# Patient Record
Sex: MALE | Race: WHITE | NOT HISPANIC OR LATINO
[De-identification: names, ages, dates, MRNs, and addresses within clinical notes are randomized per-mention and may not be internally consistent; named-entity substitution may affect disease eponyms.]

---

## 2017-01-02 ENCOUNTER — FORM ENCOUNTER (OUTPATIENT)
Age: 72
End: 2017-01-02

## 2017-01-03 ENCOUNTER — APPOINTMENT (OUTPATIENT)
Dept: CT IMAGING | Facility: IMAGING CENTER | Age: 72
End: 2017-01-03

## 2017-01-03 ENCOUNTER — APPOINTMENT (OUTPATIENT)
Dept: UROLOGY | Facility: CLINIC | Age: 72
End: 2017-01-03

## 2017-01-03 ENCOUNTER — OUTPATIENT (OUTPATIENT)
Dept: OUTPATIENT SERVICES | Facility: HOSPITAL | Age: 72
LOS: 1 days | End: 2017-01-03

## 2017-01-03 ENCOUNTER — OUTPATIENT (OUTPATIENT)
Dept: OUTPATIENT SERVICES | Facility: HOSPITAL | Age: 72
LOS: 1 days | End: 2017-01-03
Payer: MEDICARE

## 2017-01-03 VITALS
TEMPERATURE: 98.3 F | HEART RATE: 64 BPM | RESPIRATION RATE: 17 BRPM | SYSTOLIC BLOOD PRESSURE: 154 MMHG | DIASTOLIC BLOOD PRESSURE: 89 MMHG

## 2017-01-03 DIAGNOSIS — R31.29 OTHER MICROSCOPIC HEMATURIA: ICD-10-CM

## 2017-01-03 DIAGNOSIS — Z87.891 PERSONAL HISTORY OF NICOTINE DEPENDENCE: ICD-10-CM

## 2017-01-03 DIAGNOSIS — R35.0 FREQUENCY OF MICTURITION: ICD-10-CM

## 2017-01-03 PROCEDURE — 52000 CYSTOURETHROSCOPY: CPT

## 2017-01-03 PROCEDURE — 74178 CT ABD&PLV WO CNTR FLWD CNTR: CPT

## 2017-01-03 PROCEDURE — 82565 ASSAY OF CREATININE: CPT

## 2017-01-07 DIAGNOSIS — Z87.891 PERSONAL HISTORY OF NICOTINE DEPENDENCE: ICD-10-CM

## 2017-01-07 DIAGNOSIS — R31.29 OTHER MICROSCOPIC HEMATURIA: ICD-10-CM

## 2017-12-22 ENCOUNTER — APPOINTMENT (OUTPATIENT)
Dept: CARDIOLOGY | Facility: CLINIC | Age: 72
End: 2017-12-22
Payer: MEDICARE

## 2017-12-22 ENCOUNTER — NON-APPOINTMENT (OUTPATIENT)
Age: 72
End: 2017-12-22

## 2017-12-22 VITALS
HEIGHT: 68 IN | HEART RATE: 70 BPM | RESPIRATION RATE: 16 BRPM | DIASTOLIC BLOOD PRESSURE: 94 MMHG | BODY MASS INDEX: 28.19 KG/M2 | WEIGHT: 186 LBS | SYSTOLIC BLOOD PRESSURE: 178 MMHG

## 2017-12-22 PROCEDURE — 93000 ELECTROCARDIOGRAM COMPLETE: CPT

## 2017-12-22 PROCEDURE — 99215 OFFICE O/P EST HI 40 MIN: CPT

## 2018-01-23 ENCOUNTER — APPOINTMENT (OUTPATIENT)
Dept: CARDIOLOGY | Facility: CLINIC | Age: 73
End: 2018-01-23
Payer: MEDICARE

## 2018-01-23 ENCOUNTER — MED ADMIN CHARGE (OUTPATIENT)
Age: 73
End: 2018-01-23

## 2018-01-23 PROCEDURE — 93015 CV STRESS TEST SUPVJ I&R: CPT

## 2018-01-23 PROCEDURE — A9500: CPT

## 2018-01-23 PROCEDURE — 78452 HT MUSCLE IMAGE SPECT MULT: CPT

## 2018-01-23 RX ORDER — REGADENOSON 0.08 MG/ML
0.4 INJECTION, SOLUTION INTRAVENOUS
Qty: 1 | Refills: 0 | Status: COMPLETED | OUTPATIENT
Start: 2018-01-23

## 2018-01-23 RX ADMIN — REGADENOSON 0.4 MG/5ML: 0.08 INJECTION, SOLUTION INTRAVENOUS at 00:00

## 2018-03-19 ENCOUNTER — MEDICATION RENEWAL (OUTPATIENT)
Age: 73
End: 2018-03-19

## 2018-04-20 ENCOUNTER — APPOINTMENT (OUTPATIENT)
Dept: CARDIOLOGY | Facility: CLINIC | Age: 73
End: 2018-04-20

## 2018-07-24 ENCOUNTER — APPOINTMENT (OUTPATIENT)
Dept: UROLOGY | Facility: CLINIC | Age: 73
End: 2018-07-24
Payer: MEDICARE

## 2018-07-24 VITALS
HEART RATE: 56 BPM | RESPIRATION RATE: 15 BRPM | SYSTOLIC BLOOD PRESSURE: 149 MMHG | WEIGHT: 187 LBS | BODY MASS INDEX: 28.34 KG/M2 | TEMPERATURE: 97.9 F | HEIGHT: 68 IN | DIASTOLIC BLOOD PRESSURE: 85 MMHG

## 2018-07-24 DIAGNOSIS — N28.1 CYST OF KIDNEY, ACQUIRED: ICD-10-CM

## 2018-07-24 PROCEDURE — 99214 OFFICE O/P EST MOD 30 MIN: CPT

## 2018-07-27 LAB
APPEARANCE: CLEAR
BACTERIA: NEGATIVE
BILIRUBIN URINE: NEGATIVE
BLOOD URINE: NEGATIVE
COLOR: YELLOW
GLUCOSE QUALITATIVE U: NEGATIVE MG/DL
HYALINE CASTS: 1 /LPF
KETONES URINE: NEGATIVE
LEUKOCYTE ESTERASE URINE: NEGATIVE
MICROSCOPIC-UA: NORMAL
NITRITE URINE: NEGATIVE
PH URINE: 5.5
PROTEIN URINE: NEGATIVE MG/DL
RED BLOOD CELLS URINE: 4 /HPF
SPECIFIC GRAVITY URINE: 1.02
SQUAMOUS EPITHELIAL CELLS: 1 /HPF
UROBILINOGEN URINE: NEGATIVE MG/DL
WHITE BLOOD CELLS URINE: 1 /HPF

## 2018-08-27 ENCOUNTER — NON-APPOINTMENT (OUTPATIENT)
Age: 73
End: 2018-08-27

## 2018-08-27 ENCOUNTER — APPOINTMENT (OUTPATIENT)
Dept: CARDIOLOGY | Facility: CLINIC | Age: 73
End: 2018-08-27
Payer: MEDICARE

## 2018-08-27 VITALS
RESPIRATION RATE: 14 BRPM | HEART RATE: 62 BPM | SYSTOLIC BLOOD PRESSURE: 166 MMHG | WEIGHT: 180 LBS | BODY MASS INDEX: 27.37 KG/M2 | OXYGEN SATURATION: 97 % | DIASTOLIC BLOOD PRESSURE: 90 MMHG

## 2018-08-27 PROCEDURE — 93000 ELECTROCARDIOGRAM COMPLETE: CPT

## 2018-08-27 PROCEDURE — 99214 OFFICE O/P EST MOD 30 MIN: CPT

## 2018-08-27 RX ORDER — AMLODIPINE BESYLATE 10 MG/1
10 TABLET ORAL
Qty: 90 | Refills: 1 | Status: DISCONTINUED | COMMUNITY
Start: 2017-12-22 | End: 2018-08-27

## 2018-09-25 ENCOUNTER — APPOINTMENT (OUTPATIENT)
Dept: HEPATOLOGY | Facility: CLINIC | Age: 73
End: 2018-09-25
Payer: MEDICARE

## 2018-09-25 VITALS
TEMPERATURE: 97.8 F | DIASTOLIC BLOOD PRESSURE: 89 MMHG | BODY MASS INDEX: 27.28 KG/M2 | HEART RATE: 58 BPM | SYSTOLIC BLOOD PRESSURE: 154 MMHG | WEIGHT: 180 LBS | RESPIRATION RATE: 14 BRPM | HEIGHT: 68 IN

## 2018-09-25 DIAGNOSIS — Z78.9 OTHER SPECIFIED HEALTH STATUS: ICD-10-CM

## 2018-09-25 PROCEDURE — 99204 OFFICE O/P NEW MOD 45 MIN: CPT

## 2018-09-26 LAB
ALBUMIN SERPL ELPH-MCNC: 4.6 G/DL
ALP BLD-CCNC: 66 U/L
ALT SERPL-CCNC: 31 U/L
ANION GAP SERPL CALC-SCNC: 12 MMOL/L
AST SERPL-CCNC: 25 U/L
BASOPHILS # BLD AUTO: 0.06 K/UL
BASOPHILS NFR BLD AUTO: 0.9 %
BILIRUB SERPL-MCNC: 0.8 MG/DL
BUN SERPL-MCNC: 19 MG/DL
CALCIUM SERPL-MCNC: 9.7 MG/DL
CHLORIDE SERPL-SCNC: 103 MMOL/L
CHOLEST SERPL-MCNC: 111 MG/DL
CHOLEST/HDLC SERPL: 2.6 RATIO
CO2 SERPL-SCNC: 25 MMOL/L
CREAT SERPL-MCNC: 0.97 MG/DL
EOSINOPHIL # BLD AUTO: 0.19 K/UL
EOSINOPHIL NFR BLD AUTO: 3 %
ESTIMATED AVERAGE GLUCOSE: 114 MG/DL
FERRITIN SERPL-MCNC: 229 NG/ML
GLUCOSE SERPL-MCNC: 107 MG/DL
HBA1C MFR BLD HPLC: 5.6 %
HCT VFR BLD CALC: 46.6 %
HCV AB SER QL: NONREACTIVE
HCV S/CO RATIO: 0.09 S/CO
HDLC SERPL-MCNC: 43 MG/DL
HGB BLD-MCNC: 15.7 G/DL
IMM GRANULOCYTES NFR BLD AUTO: 0.2 %
INR PPP: 1.03 RATIO
IRON SATN MFR SERPL: 52 %
IRON SERPL-MCNC: 152 UG/DL
LDLC SERPL CALC-MCNC: 57 MG/DL
LYMPHOCYTES # BLD AUTO: 1.64 K/UL
LYMPHOCYTES NFR BLD AUTO: 25.6 %
MAN DIFF?: NORMAL
MCHC RBC-ENTMCNC: 31.7 PG
MCHC RBC-ENTMCNC: 33.7 GM/DL
MCV RBC AUTO: 94.1 FL
MONOCYTES # BLD AUTO: 0.64 K/UL
MONOCYTES NFR BLD AUTO: 10 %
NEUTROPHILS # BLD AUTO: 3.86 K/UL
NEUTROPHILS NFR BLD AUTO: 60.3 %
PLATELET # BLD AUTO: 169 K/UL
POTASSIUM SERPL-SCNC: 5 MMOL/L
PROT SERPL-MCNC: 7.3 G/DL
PT BLD: 11.6 SEC
RBC # BLD: 4.95 M/UL
RBC # FLD: 13.9 %
SODIUM SERPL-SCNC: 140 MMOL/L
TIBC SERPL-MCNC: 294 UG/DL
TRIGL SERPL-MCNC: 53 MG/DL
UIBC SERPL-MCNC: 142 UG/DL
WBC # FLD AUTO: 6.4 K/UL

## 2018-10-15 ENCOUNTER — APPOINTMENT (OUTPATIENT)
Dept: MRI IMAGING | Facility: CLINIC | Age: 73
End: 2018-10-15

## 2018-10-21 ENCOUNTER — FORM ENCOUNTER (OUTPATIENT)
Age: 73
End: 2018-10-21

## 2018-10-22 ENCOUNTER — APPOINTMENT (OUTPATIENT)
Dept: MRI IMAGING | Facility: CLINIC | Age: 73
End: 2018-10-22
Payer: MEDICARE

## 2018-10-22 ENCOUNTER — OUTPATIENT (OUTPATIENT)
Dept: OUTPATIENT SERVICES | Facility: HOSPITAL | Age: 73
LOS: 1 days | End: 2018-10-22
Payer: MEDICARE

## 2018-10-22 DIAGNOSIS — K76.0 FATTY (CHANGE OF) LIVER, NOT ELSEWHERE CLASSIFIED: ICD-10-CM

## 2018-10-22 DIAGNOSIS — Z00.8 ENCOUNTER FOR OTHER GENERAL EXAMINATION: ICD-10-CM

## 2018-10-22 PROCEDURE — 74181 MRI ABDOMEN W/O CONTRAST: CPT

## 2018-10-22 PROCEDURE — 74181 MRI ABDOMEN W/O CONTRAST: CPT | Mod: 26

## 2018-10-31 ENCOUNTER — APPOINTMENT (OUTPATIENT)
Dept: HEPATOLOGY | Facility: CLINIC | Age: 73
End: 2018-10-31
Payer: MEDICARE

## 2018-10-31 PROCEDURE — 91200 LIVER ELASTOGRAPHY: CPT

## 2018-11-05 ENCOUNTER — APPOINTMENT (OUTPATIENT)
Dept: HEPATOLOGY | Facility: CLINIC | Age: 73
End: 2018-11-05

## 2018-12-11 ENCOUNTER — APPOINTMENT (OUTPATIENT)
Dept: HEPATOLOGY | Facility: CLINIC | Age: 73
End: 2018-12-11
Payer: MEDICARE

## 2018-12-11 VITALS
SYSTOLIC BLOOD PRESSURE: 169 MMHG | BODY MASS INDEX: 27.58 KG/M2 | RESPIRATION RATE: 14 BRPM | DIASTOLIC BLOOD PRESSURE: 100 MMHG | WEIGHT: 182 LBS | HEIGHT: 68 IN | TEMPERATURE: 98.2 F | HEART RATE: 62 BPM

## 2018-12-11 PROCEDURE — 99213 OFFICE O/P EST LOW 20 MIN: CPT

## 2019-02-11 ENCOUNTER — APPOINTMENT (OUTPATIENT)
Dept: CARDIOLOGY | Facility: CLINIC | Age: 74
End: 2019-02-11

## 2019-03-18 ENCOUNTER — APPOINTMENT (OUTPATIENT)
Dept: CARDIOLOGY | Facility: CLINIC | Age: 74
End: 2019-03-18
Payer: MEDICARE

## 2019-03-18 ENCOUNTER — NON-APPOINTMENT (OUTPATIENT)
Age: 74
End: 2019-03-18

## 2019-03-18 VITALS
WEIGHT: 183 LBS | OXYGEN SATURATION: 96 % | HEIGHT: 68 IN | BODY MASS INDEX: 27.74 KG/M2 | RESPIRATION RATE: 14 BRPM | HEART RATE: 64 BPM | DIASTOLIC BLOOD PRESSURE: 92 MMHG | SYSTOLIC BLOOD PRESSURE: 171 MMHG | TEMPERATURE: 97.9 F

## 2019-03-18 PROCEDURE — 93000 ELECTROCARDIOGRAM COMPLETE: CPT

## 2019-03-18 PROCEDURE — 99214 OFFICE O/P EST MOD 30 MIN: CPT

## 2019-03-18 RX ORDER — LOSARTAN POTASSIUM 100 MG/1
100 TABLET, FILM COATED ORAL
Qty: 90 | Refills: 1 | Status: ACTIVE | COMMUNITY
Start: 2019-03-18

## 2019-03-19 NOTE — REASON FOR VISIT
[FreeTextEntry1] : Wade Amaya returns today for f/u regarding reduced HTN, CAD, impaired LV EF and LBBB.

## 2019-03-19 NOTE — DISCUSSION/SUMMARY
[FreeTextEntry1] : Cardiomyopathy, non-ischemic.  Prior w/u demonstrated LV dysfunction out of proportion to epicardial CAD, perhaps on a hypertensive basis.  LV EF stable on recent echo; remains free of sxs.\par \par HTN - BP reasonable on current meds.    \par \par CAD, with non-obstructive disease on cardiac CTA in Oct. 2015.  Pharm-stress-thallium Jan 2018 showed stable findings.\par \par LBBB - unchanged.\par \par He will continue on aspirin and his other medications.\par \par He will return for a followup visit in 6 months.

## 2019-03-19 NOTE — PHYSICAL EXAM
[General Appearance - Well Developed] : well developed [Normal Appearance] : normal appearance [Well Groomed] : well groomed [General Appearance - Well Nourished] : well nourished [General Appearance - In No Acute Distress] : no acute distress [Normal Conjunctiva] : the conjunctiva exhibited no abnormalities [Eyelids - No Xanthelasma] : the eyelids demonstrated no xanthelasmas [Normal Jugular Venous A Waves Present] : normal jugular venous A waves present [Normal Jugular Venous V Waves Present] : normal jugular venous V waves present [Respiration, Rhythm And Depth] : normal respiratory rhythm and effort [Auscultation Breath Sounds / Voice Sounds] : lungs were clear to auscultation bilaterally [Heart Rate And Rhythm] : heart rate and rhythm were normal [Bowel Sounds] : normal bowel sounds [Abnormal Walk] : normal gait [Nail Clubbing] : no clubbing of the fingernails [Cyanosis, Localized] : no localized cyanosis [] : no rash [Skin Color & Pigmentation] : normal skin color and pigmentation [Oriented To Time, Place, And Person] : oriented to person, place, and time [Impaired Insight] : insight and judgment were intact [Affect] : the affect was normal [Mood] : the mood was normal [FreeTextEntry1] : 2+ pulses in the upper and lower extremities. No edema.

## 2019-03-19 NOTE — ASSESSMENT
[FreeTextEntry1] : Cardiomyopathy, non-ischemic.  Prior w/u demonstrated LV dysfunction out of proportion to epicardial CAD.\par \par ASCAD, with non-obstructive disease on cardiac CTA in Oct. 2015\par \par HTN\par \par LBBB\par \par Mild MR/TR

## 2019-03-19 NOTE — HISTORY OF PRESENT ILLNESS
[FreeTextEntry1] : I last saw him last in August.  He has a history of HTN.  Prior echo has shown impaired LV EF.  Cardiac CT angiogram in 2015 showed non-obstructive CAD; LV EF on that study was estimated to be 52%.  \par \par As he returns today, he has been well.  With his usual activities, he describes no cardiac symptoms.  There have been no episodes of exertional chest discomfort or dyspnea. He reports no palpitations or lightheadedness. There have been no episodes of orthopnea or PND.

## 2019-06-05 ENCOUNTER — FORM ENCOUNTER (OUTPATIENT)
Age: 74
End: 2019-06-05

## 2019-06-06 ENCOUNTER — APPOINTMENT (OUTPATIENT)
Dept: ULTRASOUND IMAGING | Facility: CLINIC | Age: 74
End: 2019-06-06
Payer: MEDICARE

## 2019-06-06 ENCOUNTER — OUTPATIENT (OUTPATIENT)
Dept: OUTPATIENT SERVICES | Facility: HOSPITAL | Age: 74
LOS: 1 days | End: 2019-06-06
Payer: MEDICARE

## 2019-06-06 DIAGNOSIS — K75.3 GRANULOMATOUS HEPATITIS, NOT ELSEWHERE CLASSIFIED: ICD-10-CM

## 2019-06-06 DIAGNOSIS — K76.0 FATTY (CHANGE OF) LIVER, NOT ELSEWHERE CLASSIFIED: ICD-10-CM

## 2019-06-06 PROCEDURE — 76700 US EXAM ABDOM COMPLETE: CPT

## 2019-06-06 PROCEDURE — 76700 US EXAM ABDOM COMPLETE: CPT | Mod: 26

## 2019-06-11 ENCOUNTER — APPOINTMENT (OUTPATIENT)
Dept: HEPATOLOGY | Facility: CLINIC | Age: 74
End: 2019-06-11
Payer: MEDICARE

## 2019-06-11 VITALS
HEIGHT: 68 IN | DIASTOLIC BLOOD PRESSURE: 84 MMHG | HEART RATE: 63 BPM | BODY MASS INDEX: 27.74 KG/M2 | SYSTOLIC BLOOD PRESSURE: 154 MMHG | WEIGHT: 183 LBS | TEMPERATURE: 97.7 F

## 2019-06-11 PROCEDURE — 99214 OFFICE O/P EST MOD 30 MIN: CPT
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
## 2019-06-11 NOTE — CONSULT LETTER
[Please see my note below.] : Please see my note below. [Dear  ___] : Dear  [unfilled], [Courtesy Letter:] : I had the pleasure of seeing your patient, [unfilled], in my office today. [Referral Closing:] : Thank you very much for seeing this patient.  If you have any questions, please do not hesitate to contact me. [Sincerely,] : Sincerely, [FreeTextEntry2] : PCP, ALLISON MARKS\par  [FreeTextEntry3] : Bulmaro Glasgow MD\par , [facility]\par General Hepatology and Gastroenterology\par [facility]\par [facility]\par [address] [place] [address]\[place] [address]\par [phone]\par

## 2019-09-08 LAB
APPEARANCE: CLEAR
BACTERIA UR CULT: NORMAL
BACTERIA: NEGATIVE
BILIRUBIN URINE: NEGATIVE
BLOOD URINE: NEGATIVE
COLOR: YELLOW
GLUCOSE QUALITATIVE U: NEGATIVE
HYALINE CASTS: 1 /LPF
KETONES URINE: NEGATIVE
LEUKOCYTE ESTERASE URINE: NEGATIVE
MICROSCOPIC-UA: NORMAL
NITRITE URINE: NEGATIVE
PH URINE: 6.5
PROTEIN URINE: NORMAL
RED BLOOD CELLS URINE: 6 /HPF
SPECIFIC GRAVITY URINE: 1.02
SQUAMOUS EPITHELIAL CELLS: 2 /HPF
UROBILINOGEN URINE: NORMAL
WHITE BLOOD CELLS URINE: 2 /HPF

## 2019-09-10 ENCOUNTER — APPOINTMENT (OUTPATIENT)
Dept: UROLOGY | Facility: CLINIC | Age: 74
End: 2019-09-10
Payer: MEDICARE

## 2019-09-10 DIAGNOSIS — R35.1 NOCTURIA: ICD-10-CM

## 2019-09-10 DIAGNOSIS — G47.30 SLEEP APNEA, UNSPECIFIED: ICD-10-CM

## 2019-09-10 PROCEDURE — 99213 OFFICE O/P EST LOW 20 MIN: CPT

## 2019-09-11 PROBLEM — R35.1 NOCTURIA: Status: ACTIVE | Noted: 2018-07-24

## 2019-09-11 PROBLEM — G47.30 SLEEP APNEA: Status: ACTIVE | Noted: 2019-09-11

## 2019-09-11 NOTE — LETTER BODY
[FreeTextEntry1] : Percy Merlos MD\par [address]\[place] [address]\par \par Dear Dr. Merlos,\par \par Wade Amaya returns to the office today. He is a 74-year-old man who I last saw in July 2018. I have seen him in the past regarding microscopic hematuria and in 2017 performed evaluation for the hematuria with CT urogram and cystoscopy which both did not show any evidence of any malignant findings within the urinary tract. I have also discussed with him his baseline voiding symptoms. He has nocturia that fluctuates between one and 4 times overnight. He is not having any pain or discomfort with urination. He denies gross hematuria. He has had no suprapubic or flank pain. His appetite his weight are stable. He denies constipation. CT imaging has also demonstrated the presence of a benign renal cyst in the right kidney which measures just under 10 cm in size. Given that he has been asymptomatic related to the cyst, there has been no intervention indicated.\par \par He feels subjectively now as no urinary symptoms may have gotten worse but they seem to come and go and at times he wakes up very minimally overnight while at other times his nocturia is more significant. We have discussed intervention for these symptoms in the past and he has always preferred not to use any medical therapy.\par \par The patient does report that he does wake up sometimes overnight to urinate feeling mildly short of breath and he does snore significantly by his wife's report. I would recommend that he consider undergoing sleep apnea testing as this may be related to his nocturia and may be the primary cause of his waking up at night rather than a true urge to urinate. Given the variability of his symptoms, I think this is fairly likely. He will discuss a referral for sleep apnea testing with you. He continues to prefer to avoid any medical treatment for the voiding symptoms at this time.\par \par Sincerely,\par \par \par \par \par Valeriano Weaver MD, FACS\par  of Urology\par [employer] [employer] [employer] [facility]\par \par [facility]\par Director of Robotics and Minimally Invasive Surgery\par [address] [place] [address], [facility]\[place] [address]\par P: [phone]\par F: [phone]\par [email or web address]\par \par \par

## 2019-09-16 ENCOUNTER — APPOINTMENT (OUTPATIENT)
Dept: CARDIOLOGY | Facility: CLINIC | Age: 74
End: 2019-09-16
Payer: MEDICARE

## 2019-09-16 ENCOUNTER — NON-APPOINTMENT (OUTPATIENT)
Age: 74
End: 2019-09-16

## 2019-09-16 VITALS
SYSTOLIC BLOOD PRESSURE: 142 MMHG | DIASTOLIC BLOOD PRESSURE: 85 MMHG | OXYGEN SATURATION: 95 % | BODY MASS INDEX: 27.58 KG/M2 | HEART RATE: 63 BPM | HEIGHT: 68 IN | WEIGHT: 182 LBS

## 2019-09-16 PROCEDURE — 93000 ELECTROCARDIOGRAM COMPLETE: CPT

## 2019-09-16 PROCEDURE — 93306 TTE W/DOPPLER COMPLETE: CPT

## 2019-09-16 PROCEDURE — 99214 OFFICE O/P EST MOD 30 MIN: CPT

## 2019-09-16 NOTE — HISTORY OF PRESENT ILLNESS
[FreeTextEntry1] : I last saw him last in March.  He has a history of HTN.  Prior echo has shown impaired LV EF.  Cardiac CT angiogram in 2015 showed non-obstructive CAD; LV EF on that study was estimated to be 52%.  \par \par As he returns today, he remains With his usual activities, he describes no cardiac symptoms.  There have been no episodes of exertional chest discomfort or dyspnea. He reports no palpitations or lightheadedness. There have been no episodes of orthopnea or PND.\par \par He reports an intermittent cough which typically occurs postprandially.  He brings up a clear phlegm when he experiences this.  It used to only happen when he ate a large meal, but now can happen even after a small meal.

## 2019-09-16 NOTE — PHYSICAL EXAM
[General Appearance - Well Developed] : well developed [Normal Appearance] : normal appearance [Well Groomed] : well groomed [General Appearance - In No Acute Distress] : no acute distress [General Appearance - Well Nourished] : well nourished [Normal Conjunctiva] : the conjunctiva exhibited no abnormalities [Eyelids - No Xanthelasma] : the eyelids demonstrated no xanthelasmas [Normal Jugular Venous A Waves Present] : normal jugular venous A waves present [Normal Jugular Venous V Waves Present] : normal jugular venous V waves present [Respiration, Rhythm And Depth] : normal respiratory rhythm and effort [Auscultation Breath Sounds / Voice Sounds] : lungs were clear to auscultation bilaterally [Heart Rate And Rhythm] : heart rate and rhythm were normal [Bowel Sounds] : normal bowel sounds [Abnormal Walk] : normal gait [Cyanosis, Localized] : no localized cyanosis [Nail Clubbing] : no clubbing of the fingernails [Skin Color & Pigmentation] : normal skin color and pigmentation [] : no rash [Oriented To Time, Place, And Person] : oriented to person, place, and time [Impaired Insight] : insight and judgment were intact [Affect] : the affect was normal [Mood] : the mood was normal [FreeTextEntry1] : 2+ pulses in the upper and lower extremities. No edema.

## 2019-09-16 NOTE — REASON FOR VISIT
[FreeTextEntry1] : \par Wade Amaya returns today for f/u regarding reduced HTN, CAD, impaired LV EF and LBBB.

## 2019-09-16 NOTE — DISCUSSION/SUMMARY
[FreeTextEntry1] : \par Cardiomyopathy, non-ischemic.  Prior w/u demonstrated LV dysfunction out of proportion to epicardial CAD, perhaps on a hypertensive basis.  LV EF stable on echo; remains free of sxs.  Will continue losartan and carvedilol at current doses, as well as Dyazide.\par \par HTN - BP reasonable on current meds.    \par \par CAD, with non-obstructive disease on cardiac CTA in Oct. 2015.  Pharm-stress-thallium Jan 2018 showed stable findings.  Continue atorvastatin and aspirin.\par \par LBBB - unchanged.\par \par Postprandial cough suggest possible gastroesophageal reflux.  I asked him to bring this to the attention of Dr. Mrelos, to clarify whether further assessment by an ENT physician or possibly a gastroenterologist is warranted.  I suggested that he could also try over-the-counter omeprazole to see if this might provide some relief.\par \par He will return for a followup visit in 6 months.

## 2020-03-17 ENCOUNTER — APPOINTMENT (OUTPATIENT)
Dept: CARDIOLOGY | Facility: CLINIC | Age: 75
End: 2020-03-17

## 2020-04-28 LAB
ALBUMIN SERPL ELPH-MCNC: 4.5 G/DL
ALP BLD-CCNC: 71 U/L
ALT SERPL-CCNC: 35 U/L
ANION GAP SERPL CALC-SCNC: 11 MMOL/L
AST SERPL-CCNC: 26 U/L
BASOPHILS # BLD AUTO: 0.07 K/UL
BASOPHILS NFR BLD AUTO: 1.1 %
BILIRUB SERPL-MCNC: 0.7 MG/DL
BUN SERPL-MCNC: 22 MG/DL
CALCIUM SERPL-MCNC: 9.1 MG/DL
CHLORIDE SERPL-SCNC: 106 MMOL/L
CO2 SERPL-SCNC: 25 MMOL/L
CREAT SERPL-MCNC: 0.89 MG/DL
EOSINOPHIL # BLD AUTO: 0.1 K/UL
EOSINOPHIL NFR BLD AUTO: 1.6 %
GLUCOSE SERPL-MCNC: 109 MG/DL
HCT VFR BLD CALC: 47.6 %
HGB BLD-MCNC: 15.6 G/DL
IMM GRANULOCYTES NFR BLD AUTO: 0.3 %
LYMPHOCYTES # BLD AUTO: 1.96 K/UL
LYMPHOCYTES NFR BLD AUTO: 31.4 %
MAN DIFF?: NORMAL
MCHC RBC-ENTMCNC: 31.1 PG
MCHC RBC-ENTMCNC: 32.8 GM/DL
MCV RBC AUTO: 94.8 FL
MONOCYTES # BLD AUTO: 0.63 K/UL
MONOCYTES NFR BLD AUTO: 10.1 %
NEUTROPHILS # BLD AUTO: 3.47 K/UL
NEUTROPHILS NFR BLD AUTO: 55.5 %
PLATELET # BLD AUTO: 144 K/UL
POTASSIUM SERPL-SCNC: 4.3 MMOL/L
PROT SERPL-MCNC: 6.7 G/DL
RBC # BLD: 5.02 M/UL
RBC # FLD: 13.5 %
SODIUM SERPL-SCNC: 142 MMOL/L
WBC # FLD AUTO: 6.25 K/UL

## 2020-06-02 ENCOUNTER — OUTPATIENT (OUTPATIENT)
Dept: OUTPATIENT SERVICES | Facility: HOSPITAL | Age: 75
LOS: 1 days | End: 2020-06-02
Payer: MEDICARE

## 2020-06-02 ENCOUNTER — RESULT REVIEW (OUTPATIENT)
Age: 75
End: 2020-06-02

## 2020-06-02 ENCOUNTER — APPOINTMENT (OUTPATIENT)
Dept: ULTRASOUND IMAGING | Facility: CLINIC | Age: 75
End: 2020-06-02
Payer: MEDICARE

## 2020-06-02 DIAGNOSIS — K75.3 GRANULOMATOUS HEPATITIS, NOT ELSEWHERE CLASSIFIED: ICD-10-CM

## 2020-06-02 PROCEDURE — 76700 US EXAM ABDOM COMPLETE: CPT

## 2020-06-02 PROCEDURE — 76700 US EXAM ABDOM COMPLETE: CPT | Mod: 26

## 2020-06-03 ENCOUNTER — APPOINTMENT (OUTPATIENT)
Dept: HEPATOLOGY | Facility: CLINIC | Age: 75
End: 2020-06-03

## 2020-06-09 ENCOUNTER — APPOINTMENT (OUTPATIENT)
Dept: HEPATOLOGY | Facility: CLINIC | Age: 75
End: 2020-06-09

## 2020-06-29 ENCOUNTER — APPOINTMENT (OUTPATIENT)
Dept: HEPATOLOGY | Facility: CLINIC | Age: 75
End: 2020-06-29
Payer: MEDICARE

## 2020-06-29 VITALS
HEIGHT: 68 IN | HEART RATE: 71 BPM | DIASTOLIC BLOOD PRESSURE: 84 MMHG | BODY MASS INDEX: 28.95 KG/M2 | TEMPERATURE: 98 F | RESPIRATION RATE: 14 BRPM | SYSTOLIC BLOOD PRESSURE: 165 MMHG | WEIGHT: 191 LBS

## 2020-06-29 DIAGNOSIS — K21.9 GASTRO-ESOPHAGEAL REFLUX DISEASE W/OUT ESOPHAGITIS: ICD-10-CM

## 2020-06-29 PROCEDURE — 99213 OFFICE O/P EST LOW 20 MIN: CPT

## 2020-06-29 RX ORDER — TRIAMTERENE AND HYDROCHLOROTHIAZIDE 37.5; 25 MG/1; MG/1
37.5-25 CAPSULE ORAL DAILY
Qty: 30 | Refills: 1 | Status: DISCONTINUED | COMMUNITY
Start: 2018-03-19 | End: 2020-06-29
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
## 2020-06-29 NOTE — ASSESSMENT
[FreeTextEntry1] : - NAFLD on US 7/2018 and fibroscan 10/2018 with stage 2 of 3 steatosis, (MR elastography 10/2018 had technical failure)\par - overweight, BMI 29 after 10 lb sweight gain, glc mildly elevated, but HbA1c normal, lipids normal\par - LFTs and lipids normal\par - mild thrombocytopenia\par - calcified granuloma in liver on US - stable between US 6/2019 and 6/2020 - no symptoms to suggest active chronic disease like TB. MRI wo contrast did not report it, but patient is concerned about it.\par - GERD: does not have symptoms when takes his pills before food\par No evidence of liver disease on labs, fatty liver not confirmed on MRI; fibroscan suggested no significant liver fibrosis.\par - colonosocpoy done around 2018 elsewhere\par \par Plan:\par - NAFLD: repeat fibroscan and labs in 1 year\par - liver granulomas, no/slow progression in 1 year. No further follow-up. \par - GERD: no treatment required\par \par

## 2020-06-29 NOTE — CONSULT LETTER
[Dear  ___] : Dear  [unfilled], [Courtesy Letter:] : I had the pleasure of seeing your patient, [unfilled], in my office today. [Please see my note below.] : Please see my note below. [Referral Closing:] : Thank you very much for seeing this patient.  If you have any questions, please do not hesitate to contact me. [Sincerely,] : Sincerely, [FreeTextEntry2] : PCP, ALLISON MARKS\par  [FreeTextEntry3] : Bulmaro Glasgow MD\par , [facility]\par General Hepatology and Gastroenterology\par [facility]\par [facility]\par [address] [place] [address]\[place] [address]\par [phone]\par  98

## 2020-10-12 ENCOUNTER — APPOINTMENT (OUTPATIENT)
Dept: CARDIOLOGY | Facility: CLINIC | Age: 75
End: 2020-10-12
Payer: MEDICARE

## 2020-10-12 ENCOUNTER — NON-APPOINTMENT (OUTPATIENT)
Age: 75
End: 2020-10-12

## 2020-10-12 VITALS
RESPIRATION RATE: 14 BRPM | HEART RATE: 57 BPM | SYSTOLIC BLOOD PRESSURE: 155 MMHG | WEIGHT: 185 LBS | BODY MASS INDEX: 28.04 KG/M2 | DIASTOLIC BLOOD PRESSURE: 90 MMHG | TEMPERATURE: 97.3 F | HEIGHT: 68 IN | OXYGEN SATURATION: 98 %

## 2020-10-12 PROCEDURE — 99214 OFFICE O/P EST MOD 30 MIN: CPT

## 2020-10-12 PROCEDURE — 93000 ELECTROCARDIOGRAM COMPLETE: CPT

## 2020-10-12 RX ORDER — AMLODIPINE BESYLATE 10 MG/1
10 TABLET ORAL
Qty: 90 | Refills: 3 | Status: ACTIVE | COMMUNITY

## 2020-10-12 RX ORDER — HYDROCHLOROTHIAZIDE AND TRIAMTERENE 25; 37.5 MG/1; MG/1
37.5-25 CAPSULE ORAL
Qty: 90 | Refills: 1 | Status: ACTIVE | COMMUNITY

## 2020-10-12 NOTE — PHYSICAL EXAM
[General Appearance - Well Developed] : well developed [Normal Appearance] : normal appearance [Well Groomed] : well groomed [General Appearance - Well Nourished] : well nourished [General Appearance - In No Acute Distress] : no acute distress [Normal Conjunctiva] : the conjunctiva exhibited no abnormalities [Eyelids - No Xanthelasma] : the eyelids demonstrated no xanthelasmas [Normal Jugular Venous A Waves Present] : normal jugular venous A waves present [Normal Jugular Venous V Waves Present] : normal jugular venous V waves present [Respiration, Rhythm And Depth] : normal respiratory rhythm and effort [Auscultation Breath Sounds / Voice Sounds] : lungs were clear to auscultation bilaterally [Heart Rate And Rhythm] : heart rate and rhythm were normal [Bowel Sounds] : normal bowel sounds [Abnormal Walk] : normal gait [Nail Clubbing] : no clubbing of the fingernails [Cyanosis, Localized] : no localized cyanosis [] : no rash [Oriented To Time, Place, And Person] : oriented to person, place, and time [Impaired Insight] : insight and judgment were intact [Affect] : the affect was normal [Mood] : the mood was normal [FreeTextEntry1] : Venous stasis changes over shins

## 2020-10-12 NOTE — HISTORY OF PRESENT ILLNESS
[FreeTextEntry1] : I last saw him last one year ago.  He has a history of HTN.  Prior echo has shown impaired LV EF.  Cardiac CT angiogram in 2015 showed non-obstructive CAD; LV EF on that study was estimated to be 52%.  \par \par As he returns today, he has been well.  He is able to continue his usual activities and he reports no cardiac symptoms.  He recounts no episodes of exertional chest discomfort or dyspnea. He reports no palpitations or lightheadedness. He describes no episodes of orthopnea or PND.\par \par He reports mild dependent edema; Dr. Merlos prescribed Dyazide which he has been taking on an intermittent basis.

## 2020-10-12 NOTE — DISCUSSION/SUMMARY
[FreeTextEntry1] : \par Cardiomyopathy, non-ischemic.  Prior w/u demonstrated LV dysfunction out of proportion to epicardial CAD, perhaps on a hypertensive basis.  Remains free of sxs.  To continue losartan, carvedilol and amlodipine.\par \par HTN - systolic BP reasonable but higher than ideal.  He currently uses Dyazide prn for LE edema but I suggested he take it at least twice each week, to help his BP control.\par \par CAD, with non-obstructive disease on cardiac CTA in Oct. 2015.  Pharm-stress-thallium Jan 2018 showed stable findings.  Continue atorvastatin and aspirin.\par \par LBBB - unchanged.\par \par LE edema, due to venous insufficiency and also in part side effect of amlodipine.  Should continue lowfat diet and Dyazide.\par \par Offered flu vaccination; he prefers to wait until he sees Dr. Merlos.\par \par Will request most recent blood work from Dr. Merlos.\par \par Mr. Amaya will return for a followup visit in 6 months.

## 2021-04-12 ENCOUNTER — APPOINTMENT (OUTPATIENT)
Dept: CARDIOLOGY | Facility: CLINIC | Age: 76
End: 2021-04-12
Payer: MEDICARE

## 2021-04-12 NOTE — HISTORY OF PRESENT ILLNESS
[FreeTextEntry1] : I last saw him last in October.  He has a history of HTN.  Prior echo has shown impaired LV EF.  Cardiac CT angiogram in 2015 showed non-obstructive CAD; LV EF on that study was estimated to be 52%.  \par \par As he returns today,\par \par  he has been well.  He is able to continue his usual activities and he reports no cardiac symptoms.  He recounts no episodes of exertional chest discomfort or dyspnea. He reports no palpitations or lightheadedness. He describes no episodes of orthopnea or PND.\par \par He reports mild dependent edema; Dr. Merlos prescribed Dyazide which he has been taking on an intermittent basis.

## 2021-04-12 NOTE — REASON FOR VISIT
[FreeTextEntry1] : PATIENT CANCELLED APPT. \par \par \par PRELIMINARY NOTE\par \par \par Wade Miluso returns today for f/u regarding reduced HTN, CAD, impaired LV EF and LBBB.

## 2021-04-12 NOTE — PHYSICAL EXAM
[Normal Appearance] : normal appearance [General Appearance - Well Developed] : well developed [Well Groomed] : well groomed [General Appearance - Well Nourished] : well nourished [General Appearance - In No Acute Distress] : no acute distress [Normal Conjunctiva] : the conjunctiva exhibited no abnormalities [Eyelids - No Xanthelasma] : the eyelids demonstrated no xanthelasmas [Normal Jugular Venous A Waves Present] : normal jugular venous A waves present [Normal Jugular Venous V Waves Present] : normal jugular venous V waves present [Respiration, Rhythm And Depth] : normal respiratory rhythm and effort [Auscultation Breath Sounds / Voice Sounds] : lungs were clear to auscultation bilaterally [Heart Rate And Rhythm] : heart rate and rhythm were normal [Bowel Sounds] : normal bowel sounds [Abnormal Walk] : normal gait [Nail Clubbing] : no clubbing of the fingernails [Cyanosis, Localized] : no localized cyanosis [] : no rash [Oriented To Time, Place, And Person] : oriented to person, place, and time [Affect] : the affect was normal [Impaired Insight] : insight and judgment were intact [Mood] : the mood was normal [FreeTextEntry1] : Venous stasis changes over shins

## 2021-04-12 NOTE — DISCUSSION/SUMMARY
[FreeTextEntry1] : PRELIMINARY NOTE\par \par \par Cardiomyopathy, non-ischemic.  Prior w/u demonstrated LV dysfunction out of proportion to epicardial CAD, perhaps on a hypertensive basis.  Remains free of sxs.  To continue losartan, carvedilol and amlodipine.\par \par HTN - systolic BP reasonable but higher than ideal.  He currently uses Dyazide prn for LE edema but I suggested he take it at least twice each week, to help his BP control.\par \par CAD, with non-obstructive disease on cardiac CTA in Oct. 2015.  Pharm-stress-thallium Jan 2018 showed stable findings.  Continue atorvastatin and aspirin.\par \par LBBB - unchanged.\par \par LE edema, due to venous insufficiency and also in part side effect of amlodipine.  Should continue lowfat diet and Dyazide.\par \par Will request most recent blood work from Dr. Merlos.\par \par Mr. Amaya will return for a followup visit in 6 months.

## 2021-06-21 ENCOUNTER — APPOINTMENT (OUTPATIENT)
Dept: HEPATOLOGY | Facility: CLINIC | Age: 76
End: 2021-06-21

## 2021-07-12 ENCOUNTER — APPOINTMENT (OUTPATIENT)
Dept: HEPATOLOGY | Facility: CLINIC | Age: 76
End: 2021-07-12

## 2021-07-14 ENCOUNTER — APPOINTMENT (OUTPATIENT)
Dept: HEPATOLOGY | Facility: CLINIC | Age: 76
End: 2021-07-14
Payer: MEDICARE

## 2021-07-14 VITALS
DIASTOLIC BLOOD PRESSURE: 81 MMHG | HEIGHT: 68 IN | BODY MASS INDEX: 27.86 KG/M2 | WEIGHT: 183.8 LBS | TEMPERATURE: 97.8 F | HEART RATE: 71 BPM | SYSTOLIC BLOOD PRESSURE: 129 MMHG | RESPIRATION RATE: 14 BRPM

## 2021-07-14 PROCEDURE — 99214 OFFICE O/P EST MOD 30 MIN: CPT

## 2021-07-14 PROCEDURE — 91200 LIVER ELASTOGRAPHY: CPT

## 2021-07-14 RX ORDER — FLUTICASONE PROPIONATE 50 UG/1
50 SPRAY, METERED NASAL
Qty: 16 | Refills: 0 | Status: DISCONTINUED | COMMUNITY
Start: 2021-04-08

## 2021-07-15 LAB
ALBUMIN SERPL ELPH-MCNC: 4.9 G/DL
ALP BLD-CCNC: 61 U/L
ALT SERPL-CCNC: 28 U/L
ANION GAP SERPL CALC-SCNC: 12 MMOL/L
AST SERPL-CCNC: 25 U/L
BASOPHILS # BLD AUTO: 0.07 K/UL
BASOPHILS NFR BLD AUTO: 1.1 %
BILIRUB SERPL-MCNC: 0.9 MG/DL
BUN SERPL-MCNC: 22 MG/DL
CALCIUM SERPL-MCNC: 9.7 MG/DL
CHLORIDE SERPL-SCNC: 105 MMOL/L
CHOLEST SERPL-MCNC: 126 MG/DL
CO2 SERPL-SCNC: 23 MMOL/L
CREAT SERPL-MCNC: 1.09 MG/DL
EOSINOPHIL # BLD AUTO: 0.13 K/UL
EOSINOPHIL NFR BLD AUTO: 2.1 %
ESTIMATED AVERAGE GLUCOSE: 114 MG/DL
GLUCOSE SERPL-MCNC: 87 MG/DL
HBA1C MFR BLD HPLC: 5.6 %
HCT VFR BLD CALC: 44.3 %
HDLC SERPL-MCNC: 45 MG/DL
HGB BLD-MCNC: 14.8 G/DL
IMM GRANULOCYTES NFR BLD AUTO: 0 %
INR PPP: 1.12 RATIO
LDLC SERPL CALC-MCNC: 67 MG/DL
LYMPHOCYTES # BLD AUTO: 1.98 K/UL
LYMPHOCYTES NFR BLD AUTO: 32.5 %
MAN DIFF?: NORMAL
MCHC RBC-ENTMCNC: 31.4 PG
MCHC RBC-ENTMCNC: 33.4 GM/DL
MCV RBC AUTO: 94.1 FL
MONOCYTES # BLD AUTO: 0.54 K/UL
MONOCYTES NFR BLD AUTO: 8.9 %
NEUTROPHILS # BLD AUTO: 3.38 K/UL
NEUTROPHILS NFR BLD AUTO: 55.4 %
NONHDLC SERPL-MCNC: 81 MG/DL
PLATELET # BLD AUTO: 167 K/UL
POTASSIUM SERPL-SCNC: 4 MMOL/L
PROT SERPL-MCNC: 7 G/DL
PT BLD: 13.3 SEC
RBC # BLD: 4.71 M/UL
RBC # FLD: 13.2 %
SODIUM SERPL-SCNC: 140 MMOL/L
TRIGL SERPL-MCNC: 68 MG/DL
WBC # FLD AUTO: 6.1 K/UL

## 2021-07-15 NOTE — ASSESSMENT
[FreeTextEntry1] : Mr. SARMIENTO is a 75 year old man with HTN, renal cysts, small aortic aneurysm, who was referred because of fatty liver seen on US in 7/2018.\par \par -07/14/2021: Fibroscan revealed  (S0) and E 3.3 kPa (F0-1)\par \par 1.  NAFLD \par -Fibroscan revealed So and F0-1, BW outstanding pt to repeat ASAP. \par -US outstanding for HCC screening, pt to complete ASAP\par -Overweight with BM at 28, advised to continue to monitor diet and exercise daily as currently he does not exercise. \par -Discussed the use of medications (ozempic and rybelsus) to assist with weight reduction. Declined at thia time as he wants to continue to work on weight lose via exercise and diet changes. \par - I have explained the best treatment for fatty liver is diet and exercise. I have encouraged a gradual weight loss of at least 10%. I also advised fatty liver may develop into liver cancer with/without cirrhosis and therefore continued screening with labs and ab imaging is important. \par -I have encouraged a healthy lifestyle including exercising at least 3x times weekly and maintaining a diet low in carbohydrates, fat, sodium (<2gm daily) and cholesterol. I have counseled pt on abstaining from alcohol and illicit drug use, avoid herbal and dietary supplements. Encouraged limit use of acetaminophen to <2gm per day and to avoid NSAIDS.    \par \par 2. HM\par -Covid vaccination completed via Pfizer 05/2021\par -COL: unsure of need to repeat as pt will obtain records and submit to office to review \par \par Plan:\par - BW and US ASAP\par -RTC 6mns\par - weight reduction via diet and exercise, will discuss medications again at next visit\par \par

## 2021-07-15 NOTE — PHYSICAL EXAM
[General Appearance - Alert] : alert [General Appearance - In No Acute Distress] : in no acute distress [General Appearance - Well Nourished] : well nourished [Sclera] : the sclera and conjunctiva were normal [Neck Appearance] : the appearance of the neck was normal [Jugular Venous Distention Increased] : there was no jugular-venous distention [Respiration, Rhythm And Depth] : normal respiratory rhythm and effort [Auscultation Breath Sounds / Voice Sounds] : lungs were clear to auscultation bilaterally [Heart Rate And Rhythm] : heart rate was normal and rhythm regular [Heart Sounds] : normal S1 and S2 [Edema] : there was no peripheral edema [Bowel Sounds] : normal bowel sounds [Abdomen Soft] : soft [Abdomen Tenderness] : non-tender [No CVA Tenderness] : no ~M costovertebral angle tenderness [No Spinal Tenderness] : no spinal tenderness [Abnormal Walk] : normal gait [Skin Color & Pigmentation] : normal skin color and pigmentation [] : no rash [Oriented To Time, Place, And Person] : oriented to person, place, and time [Impaired Insight] : insight and judgment were intact [Affect] : the affect was normal [Scleral Icterus] : No Scleral Icterus [Abdominal Bruit] : no abdominal bruit [Abdominal  Ascites] : no ascites [Asterixis] : no asterixis observed [Jaundice] : No jaundice [FreeTextEntry1] : b/l [place] no hearing devices

## 2021-07-15 NOTE — REVIEW OF SYSTEMS
[Loss Of Hearing] : hearing loss [Negative] : Heme/Lymph [Fever] : no fever [Chills] : no chills [Shortness Of Breath] : no shortness of breath [Wheezing] : no wheezing [Abdominal Pain] : no abdominal pain [Melena] : no melena [Dysuria] : no dysuria [Limb Swelling] : no limb swelling [Itching] : no itching [Confused] : no confusion [Dizziness] : no dizziness [Fainting] : no fainting

## 2021-07-15 NOTE — HISTORY OF PRESENT ILLNESS
[Fatigue] : denies fatigue [Malaise] : denies malaise [Fever] : denies fever [Diffuse Joint Pain (Arthralgias)] : denies arthralgias [Muscle Aches, Generalized (Myalgias)] : denies myalgias [Yellow Skin Or Eyes (Jaundice)] : denies jaundice [Abdominal Pain] : denies abdominal pain [Urine Tests Nonspecific Abnormal Findings Biliuria] : denies dark urine [Light Colored Bowel Movement (Acholic Stools)] : denies pale stools [Insomnia] : denies insomnia [Skin: Rash] : denies rash [Itching (Pruritus)] : denies pruritus [Shortness Of Breath] : denies shortness of breath [Needlestick Exposure] : no needlestick exposure [Infected Sexual Partner] : no infected sexual partner [IV Drug Use] : no IV drug use [Tattoo] : no tattoos [Body Piercing] : no body piercing [Hemodialysis] : no hemodialysis [Transfusion before 1992] : no transfusion before 1992 [Transplant before 1992] : no transplant before 1992 [Incarceration] : no incarceration [Alcohol Abuse] : no alcohol abuse [Autoimmune Disorder] : no autoimmune disorder [Household Contact to HBV] : no household contact to HBV [Travel to Endemic Area] : no travel to an endemic area [Occupational Exposure] : no occupational exposure [Cocaine Use] : no cocaine use [de-identified] : Mr. SARMIENTO is a 75 year old man with HTN, renal cysts, small aortic aneurysm, who was referred because of fatty liver seen on US in 2018. He does not know of any abnormal LFTs in the past. His brother had alcoholic liver disease and  of liver cancer. Presents today for f/u on fatty liver and granuloma. Current BMI of 28, denies exercise and admits to consuming less food with meals, but admits to eating mostly carbs. Denies chest pain/palpitations, SOB, ab pain, n/v, melena/BRBPR, and jaundice. \par \par weight hx: BMI 27 - 180 (18), max weight 190 lbs a couple of years ago, but mostly BMI 26-27. \par alcohol: 1 glass of wine per day, never more; makes his own wine - father had renetta in [facility]\par \par Work up:\par -11/17/15:BW- normal LFTs – AST/ALT \par -2017: COL complete no record on file  \par -2018: US abdomen -- mild fatty liver. Bilateral renal cysts up to 9.6 cm. Spleen normal.\par - 18: glc 107, AST 25, ALT 31, ALP 66, Bili 0.8, ferritin 229, HbA1c 5.6%\par -10/2018:  MR elastography no definite morphology features to suggest cirrhosis. No steatosis. MR elastography nondiagnostic (technical failure). GB and bile ducts normal, spleen wnl, bilat. renal cysts - larges RUQ pole 10.3 cm. Fibroscan  (S2) and E 4.5 kPa (F0-1). \par - 19 US abdomen (scanned): Mild fatty liver, 5mm calcified granuloma.\par \par -2020: Liver stable 4mm echogenic foci right hepatic lobe \par -2020: LFT's WNL, H+H 15.6/47.6, \par -2021: Fibroscan revealed  (S0) and E 3.3 kPa (F0-1)\par \par ROS as below\par

## 2021-07-22 ENCOUNTER — NON-APPOINTMENT (OUTPATIENT)
Age: 76
End: 2021-07-22

## 2021-07-22 LAB — PHOSPHATIDYETHANOL (PETH), WHOLE BLOOD: NEGATIVE NG/ML

## 2021-07-23 ENCOUNTER — APPOINTMENT (OUTPATIENT)
Dept: ULTRASOUND IMAGING | Facility: CLINIC | Age: 76
End: 2021-07-23
Payer: MEDICARE

## 2021-07-23 ENCOUNTER — NON-APPOINTMENT (OUTPATIENT)
Age: 76
End: 2021-07-23

## 2021-07-23 ENCOUNTER — OUTPATIENT (OUTPATIENT)
Dept: OUTPATIENT SERVICES | Facility: HOSPITAL | Age: 76
LOS: 1 days | End: 2021-07-23
Payer: MEDICARE

## 2021-07-23 DIAGNOSIS — K76.0 FATTY (CHANGE OF) LIVER, NOT ELSEWHERE CLASSIFIED: ICD-10-CM

## 2021-07-23 PROCEDURE — 76700 US EXAM ABDOM COMPLETE: CPT | Mod: 26

## 2021-07-23 PROCEDURE — 76700 US EXAM ABDOM COMPLETE: CPT

## 2021-10-28 ENCOUNTER — APPOINTMENT (OUTPATIENT)
Dept: CARDIOLOGY | Facility: CLINIC | Age: 76
End: 2021-10-28
Payer: MEDICARE

## 2021-10-28 ENCOUNTER — NON-APPOINTMENT (OUTPATIENT)
Age: 76
End: 2021-10-28

## 2021-10-28 VITALS
OXYGEN SATURATION: 97 % | BODY MASS INDEX: 27.04 KG/M2 | WEIGHT: 178.4 LBS | HEART RATE: 54 BPM | HEIGHT: 68 IN | RESPIRATION RATE: 15 BRPM | DIASTOLIC BLOOD PRESSURE: 80 MMHG | SYSTOLIC BLOOD PRESSURE: 138 MMHG

## 2021-10-28 PROCEDURE — 99214 OFFICE O/P EST MOD 30 MIN: CPT

## 2021-10-28 PROCEDURE — 93000 ELECTROCARDIOGRAM COMPLETE: CPT

## 2021-10-28 NOTE — DISCUSSION/SUMMARY
[FreeTextEntry1] : Cardiomyopathy, non-ischemic.  Prior w/u demonstrated LV dysfunction out of proportion to epicardial CAD, perhaps on a hypertensive basis. \par He remains free of sxs; he will continue losartan, carvedilol and amlodipine.\par \par HTN - systolic BP in reasonable range.  To continue current meds..\par \par CAD, with non-obstructive disease on cardiac CTA in Oct. 2015.  Pharm-stress-thallium Jan 2018 showed stable findings.  Continue atorvastatin and aspirin.\par \par LBBB - unchanged.\par \par LE edema, due to venous insufficiency and also in part side effect of amlodipine.  Should continue low salt diet and Dyazide prn .\par \par 30 minutes spent on today's office visit.\par \par Mr. Amaya will return for a followup visit in 6 months.

## 2022-03-21 ENCOUNTER — APPOINTMENT (OUTPATIENT)
Dept: HEPATOLOGY | Facility: CLINIC | Age: 77
End: 2022-03-21

## 2022-04-18 ENCOUNTER — APPOINTMENT (OUTPATIENT)
Dept: HEPATOLOGY | Facility: CLINIC | Age: 77
End: 2022-04-18
Payer: MEDICARE

## 2022-04-18 VITALS
WEIGHT: 183 LBS | DIASTOLIC BLOOD PRESSURE: 80 MMHG | OXYGEN SATURATION: 97 % | HEART RATE: 60 BPM | BODY MASS INDEX: 30.12 KG/M2 | HEIGHT: 65.5 IN | SYSTOLIC BLOOD PRESSURE: 158 MMHG | TEMPERATURE: 97.7 F | RESPIRATION RATE: 16 BRPM

## 2022-04-18 DIAGNOSIS — E66.3 OVERWEIGHT: ICD-10-CM

## 2022-04-18 DIAGNOSIS — K76.0 FATTY (CHANGE OF) LIVER, NOT ELSEWHERE CLASSIFIED: ICD-10-CM

## 2022-04-18 DIAGNOSIS — K75.3 GRANULOMATOUS HEPATITIS, NOT ELSEWHERE CLASSIFIED: ICD-10-CM

## 2022-04-18 PROCEDURE — 99213 OFFICE O/P EST LOW 20 MIN: CPT

## 2022-04-18 NOTE — ASSESSMENT
[FreeTextEntry1] : Mr. SARMIENTO is a 76 year old man with HTN, renal cysts, small aortic aneurysm, who was referred because of fatty liver seen on US in 7/2018.\par \par #  NAFLD with normal LFTs and no fibrosis\par -07/14/2021: Fibroscan normal - no fibrosis, no steatosis -  (S0) and E 3.3 kPa (F0-1)\par -US 7/2021: no fatty liver described, stable echogenic focus 3 mm. Prior ultrasounds showed fatty liver.\par -Overweight with BM at 28, advised to continue to monitor diet and exercise daily as currently he does not exercise. \par \par # hepatic granuloma \par \par # healthcare maintenance\par -Covid vaccination completed via Pfizer 05/2021\par -COL: 2017 elsewhere. Pt. was told everything was fine. \par \par Plan:\par - NAFLD: pt. will call in 2 years to schedule bloodwork, US abdomen and repeat fibroscan\par - colon cancer screening: he will check with his PCP\par \par \par

## 2022-04-18 NOTE — HISTORY OF PRESENT ILLNESS
[Fatigue] : denies fatigue [Malaise] : denies malaise [Fever] : denies fever [Diffuse Joint Pain (Arthralgias)] : denies arthralgias [Muscle Aches, Generalized (Myalgias)] : denies myalgias [Yellow Skin Or Eyes (Jaundice)] : denies jaundice [Abdominal Pain] : denies abdominal pain [Urine Tests Nonspecific Abnormal Findings Biliuria] : denies dark urine [Light Colored Bowel Movement (Acholic Stools)] : denies pale stools [Insomnia] : denies insomnia [Skin: Rash] : denies rash [Itching (Pruritus)] : denies pruritus [Shortness Of Breath] : denies shortness of breath [Needlestick Exposure] : no needlestick exposure [Infected Sexual Partner] : no infected sexual partner [IV Drug Use] : no IV drug use [Tattoo] : no tattoos [Body Piercing] : no body piercing [Hemodialysis] : no hemodialysis [Transfusion before 1992] : no transfusion before 1992 [Transplant before 1992] : no transplant before 1992 [Incarceration] : no incarceration [Alcohol Abuse] : no alcohol abuse [Autoimmune Disorder] : no autoimmune disorder [Household Contact to HBV] : no household contact to HBV [Travel to Endemic Area] : no travel to an endemic area [Occupational Exposure] : no occupational exposure [Cocaine Use] : no cocaine use [de-identified] : Mr. SARMIENTO is a 75 year old man with HTN, renal cysts, small aortic aneurysm, who was referred because of fatty liver seen on US in 2018. He does not know of any abnormal LFTs in the past. His brother had alcoholic liver disease and  of liver cancer. Presents today for f/u on fatty liver and granuloma. Current BMI of 28, denies exercise and admits to consuming less food with meals, but admits to eating mostly carbs. Denies chest pain/palpitations, SOB, ab pain, n/v, melena/BRBPR, and jaundice. \par \par weight hx: BMI 27 - 180 (18), max weight 190 lbs a couple of years ago, but mostly BMI 26-27. \par alcohol: 1 glass of wine per day, never more; makes his own wine - father had renetta in [facility]\par \par Work up:\par -11/17/15:BW- normal LFTs – AST/ALT \par -2017: COL complete no record on file  \par -2018: US abdomen -- mild fatty liver. Bilateral renal cysts up to 9.6 cm. Spleen normal.\par - 18: glc 107, AST 25, ALT 31, ALP 66, Bili 0.8, ferritin 229, HbA1c 5.6%\par -10/2018:  MR elastography no definite morphology features to suggest cirrhosis. No steatosis. MR elastography nondiagnostic (technical failure). GB and bile ducts normal, spleen wnl, bilat. renal cysts - larges RUQ pole 10.3 cm. Fibroscan  (S2) and E 4.5 kPa (F0-1). \par - 19 US abdomen (scanned): Mild fatty liver, 5mm calcified granuloma.\par \par -2020: Liver stable 4mm echogenic foci right hepatic lobe \par -2020: LFT's WNL, H+H 15.6/47.6, \par -2021: Fibroscan revealed  (S0) and E 3.3 kPa (F0-1)\par \par ROS as below\par

## 2022-04-28 ENCOUNTER — APPOINTMENT (OUTPATIENT)
Dept: CARDIOLOGY | Facility: CLINIC | Age: 77
End: 2022-04-28
Payer: MEDICARE

## 2022-04-28 ENCOUNTER — NON-APPOINTMENT (OUTPATIENT)
Age: 77
End: 2022-04-28

## 2022-04-28 VITALS
OXYGEN SATURATION: 95 % | SYSTOLIC BLOOD PRESSURE: 138 MMHG | HEART RATE: 54 BPM | DIASTOLIC BLOOD PRESSURE: 80 MMHG | RESPIRATION RATE: 15 BRPM | BODY MASS INDEX: 29.96 KG/M2 | WEIGHT: 182 LBS | HEIGHT: 65.5 IN

## 2022-04-28 DIAGNOSIS — I34.0 NONRHEUMATIC MITRAL (VALVE) INSUFFICIENCY: ICD-10-CM

## 2022-04-28 PROCEDURE — 99214 OFFICE O/P EST MOD 30 MIN: CPT

## 2022-04-28 PROCEDURE — 93000 ELECTROCARDIOGRAM COMPLETE: CPT

## 2022-04-28 NOTE — HISTORY OF PRESENT ILLNESS
[FreeTextEntry1] :  He has a history of HTN.  Prior echo has shown impaired LV EF.  Cardiac CT angiogram in 2015 showed non-obstructive CAD; LV EF on that study was estimated to be 52%.  \par \par Since I last saw him, he remains active and well.  With his customary activities, he describes no cardiac symptoms - there have been no episodes of exertional chest discomfort or dyspnea on exertion.  He describes no palpitations or lightheadedness. He reports no episodes of orthopnea or PND.\par \par There have been no new interval medical problems.  He tells me that blood testing was recently checked by Dr. Merlos.

## 2022-04-28 NOTE — DISCUSSION/SUMMARY
[FreeTextEntry1] : Cardiomyopathy, non-ischemic, perhaps on a hypertensive basis.  Remains free of sxs. and appears well compensated.  He will continue losartan, carvedilol and amlodipine.\par \par HTN - systolic BP in acceptable range.  Continue current meds.\par \par CAD, non-obstructive disease on cardiac CTA in Oct. 2015; pharm-stress-thallium Jan 2018 showed stable findings.  He will continue atorvastatin and low dose aspirin.\par \par LBBB - unchanged.\par \par LE edema, due to venous insufficiency and also in part side effect of amlodipine.  Should continue low salt diet and Dyazide.\par \par Will request copy of recent blood work from Dr. Merlos.\par \par 32 minutes spent on today's office visit. \par \par Mr. Amaya will return for a followup visit in 6 months.  Will arrange TTE at that time to re-assess LV function.

## 2022-12-19 ENCOUNTER — NON-APPOINTMENT (OUTPATIENT)
Age: 77
End: 2022-12-19

## 2022-12-19 ENCOUNTER — APPOINTMENT (OUTPATIENT)
Dept: CARDIOLOGY | Facility: CLINIC | Age: 77
End: 2022-12-19

## 2022-12-19 VITALS
BODY MASS INDEX: 30.95 KG/M2 | SYSTOLIC BLOOD PRESSURE: 142 MMHG | DIASTOLIC BLOOD PRESSURE: 76 MMHG | HEART RATE: 60 BPM | OXYGEN SATURATION: 97 % | WEIGHT: 188 LBS | HEIGHT: 65.5 IN | RESPIRATION RATE: 17 BRPM

## 2022-12-19 PROCEDURE — 93306 TTE W/DOPPLER COMPLETE: CPT

## 2022-12-19 PROCEDURE — 99214 OFFICE O/P EST MOD 30 MIN: CPT

## 2022-12-19 PROCEDURE — 93000 ELECTROCARDIOGRAM COMPLETE: CPT

## 2022-12-19 NOTE — DISCUSSION/SUMMARY
[EKG obtained to assist in diagnosis and management of assessed problem(s)] : EKG obtained to assist in diagnosis and management of assessed problem(s) [FreeTextEntry1] : Cardiomyopathy, non-ischemic, likely on a hypertensive basis.  Remains free of sxs. and appears well compensated.  He will continue losartan and carvedilol.\par \par HTN - systolic BP in acceptable range.  Continue current meds.\par \par CAD, non-obstructive disease on cardiac CTA in Oct. 2015; pharm-stress-thallium Jan 2018 showed stable findings.  He will continue atorvastatin and low dose aspirin.\par \par LBBB - unchanged.\par \par LE edema, due to venous insufficiency and also in part side effect of amlodipine.  Should continue low salt diet and Dyazide.\par \par 32 minutes spent on today's office visit. \par \par Mr. Amaya will return for a followup visit in 6 months.

## 2022-12-19 NOTE — REASON FOR VISIT
[FreeTextEntry1] : \par aWde Amaya returns today for f/u regarding reduced HTN, CAD, impaired LV EF and LBBB.

## 2022-12-19 NOTE — HISTORY OF PRESENT ILLNESS
[FreeTextEntry1] : Since I last saw him, he continues his usual activities  & reports no cardiac symptoms.  There have been no episodes of exertional chest discomfort or ANDRADE.  He reports no palpitations & no episodes of lightheadedness or LOC.  There have been no episodes of orthopnea or PND.\par \par He reports no new interval medical problems.

## 2023-06-21 ENCOUNTER — APPOINTMENT (OUTPATIENT)
Dept: CARDIOLOGY | Facility: CLINIC | Age: 78
End: 2023-06-21
Payer: MEDICARE

## 2023-06-21 ENCOUNTER — NON-APPOINTMENT (OUTPATIENT)
Age: 78
End: 2023-06-21

## 2023-06-21 VITALS
HEIGHT: 65 IN | WEIGHT: 187 LBS | HEART RATE: 59 BPM | SYSTOLIC BLOOD PRESSURE: 136 MMHG | RESPIRATION RATE: 14 BRPM | OXYGEN SATURATION: 97 % | BODY MASS INDEX: 31.16 KG/M2 | DIASTOLIC BLOOD PRESSURE: 88 MMHG

## 2023-06-21 PROCEDURE — 93000 ELECTROCARDIOGRAM COMPLETE: CPT

## 2023-06-21 PROCEDURE — 99214 OFFICE O/P EST MOD 30 MIN: CPT

## 2023-06-21 NOTE — DISCUSSION/SUMMARY
[EKG obtained to assist in diagnosis and management of assessed problem(s)] : EKG obtained to assist in diagnosis and management of assessed problem(s) [FreeTextEntry1] : Cardiomyopathy, non-ischemic, likely on a hypertensive basis.  LV EF was 45% on TTE in Dec. of 2022.  Remains free of sxs. and appears well compensated.  Continue losartan and carvedilol.  \par \par HTN - systolic BP remains in reasonable acceptable range; continue current meds.\par \par CAD, non-obstructive disease on cardiac CTA in Oct. 2015; pharm-stress-thallium Jan 2018 showed stable findings.  Continue atorvastatin and low dose aspirin.\par \par LBBB - unchanged.\par \par LE edema, due to venous insufficiency and also in part side effect of amlodipine; remains controlled with low salt diet and Dyazide.\par \par Will request copy of most recent blood work from Dr. Merlos's office.  He does not need new prescriptions at this time.\par \par 31 minutes spent on today's office visit. \par \par Mr. Amaya will return for a followup visit in 6 months.

## 2023-06-21 NOTE — HISTORY OF PRESENT ILLNESS
[FreeTextEntry1] : Since I last saw him, he is able to continue his usual activities without problem.  He remains free of cardiac symptoms, and he describes no episodes of exertional chest discomfort or ANDRADE.  There have been no palpitations and he reports no episodes of lightheadedness or LOC.  There have been no episodes of orthopnea or PND.\par \par He reports no new interval medical problems.  Medications are unchanged.

## 2023-06-24 NOTE — PHYSICAL EXAM
[General Appearance - Well Developed] : well developed [Normal Appearance] : normal appearance [Well Groomed] : well groomed [General Appearance - Well Nourished] : well nourished [General Appearance - In No Acute Distress] : no acute distress [Normal Conjunctiva] : the conjunctiva exhibited no abnormalities [Eyelids - No Xanthelasma] : the eyelids demonstrated no xanthelasmas [Normal Jugular Venous A Waves Present] : normal jugular venous A waves present [Normal Jugular Venous V Waves Present] : normal jugular venous V waves present [Respiration, Rhythm And Depth] : normal respiratory rhythm and effort [Auscultation Breath Sounds / Voice Sounds] : lungs were clear to auscultation bilaterally [Heart Rate And Rhythm] : heart rate and rhythm were normal [Bowel Sounds] : normal bowel sounds [Abnormal Walk] : normal gait [Nail Clubbing] : no clubbing of the fingernails [Cyanosis, Localized] : no localized cyanosis [] : no rash [Oriented To Time, Place, And Person] : oriented to person, place, and time [Impaired Insight] : insight and judgment were intact [Affect] : the affect was normal [Mood] : the mood was normal [FreeTextEntry1] : Venous stasis changes over shins symmetrical

## 2023-12-19 NOTE — PHYSICAL EXAM
[General Appearance - Well Developed] : well developed [Normal Appearance] : normal appearance [Well Groomed] : well groomed [General Appearance - Well Nourished] : well nourished [General Appearance - In No Acute Distress] : no acute distress [Normal Conjunctiva] : the conjunctiva exhibited no abnormalities [Eyelids - No Xanthelasma] : the eyelids demonstrated no xanthelasmas [Normal Jugular Venous A Waves Present] : normal jugular venous A waves present [Normal Jugular Venous V Waves Present] : normal jugular venous V waves present [Respiration, Rhythm And Depth] : normal respiratory rhythm and effort [Auscultation Breath Sounds / Voice Sounds] : lungs were clear to auscultation bilaterally [Heart Rate And Rhythm] : heart rate and rhythm were normal [Bowel Sounds] : normal bowel sounds [Abnormal Walk] : normal gait [Nail Clubbing] : no clubbing of the fingernails [Cyanosis, Localized] : no localized cyanosis [] : no rash [FreeTextEntry1] : Venous stasis changes over shins [Oriented To Time, Place, And Person] : oriented to person, place, and time [Impaired Insight] : insight and judgment were intact [Affect] : the affect was normal [Mood] : the mood was normal

## 2023-12-19 NOTE — DISCUSSION/SUMMARY
[FreeTextEntry1] : EKG today shows:  PLAN: Cardiomyopathy, non-ischemic, likely on a hypertensive basis.  LV EF was 45% on TTE in Dec. of 2022.  Remains free of sxs. and appears well compensated.  Continue losartan and carvedilol.    HTN - systolic BP remains in reasonable acceptable range; continue current meds.  CAD, non-obstructive disease on cardiac CTA in Oct. 2015; pharm-stress-thallium Jan 2018 showed stable findings.  Continue atorvastatin and low dose aspirin.  LBBB - unchanged.  LE edema, due to venous insufficiency and also in part side effect of amlodipine; remains controlled with low salt diet and Dyazide.  31 minutes spent on today's office visit.   Mr. Amaya will return for a followup visit in 6 months.

## 2023-12-19 NOTE — REASON FOR VISIT
[FreeTextEntry1] : PRELIMINARY NOTE   Wade Miluso returns for f/u regarding reduced HTN, CAD, impaired LV EF and LBBB.

## 2023-12-19 NOTE — HISTORY OF PRESENT ILLNESS
[FreeTextEntry1] : Since I last saw him,     he is able to continue his usual activities without problem.  He remains free of cardiac symptoms, and he describes no episodes of exertional chest discomfort or ANDRADE.  There have been no palpitations and he reports no episodes of lightheadedness or LOC.  There have been no episodes of orthopnea or PND.  He reports no new interval medical problems.  Medications are unchanged.

## 2023-12-19 NOTE — ASSESSMENT
[FreeTextEntry1] : ====================== Cardiomyopathy, non-ischemic. Prior w/u demonstrated LV dysfunction out of proportion to epicardial CAD.  ASCAD, with non-obstructive disease on cardiac CTA in Oct. 2015  HTN  LBBB  Mild MR/TR.

## 2023-12-20 ENCOUNTER — NON-APPOINTMENT (OUTPATIENT)
Age: 78
End: 2023-12-20

## 2023-12-20 ENCOUNTER — APPOINTMENT (OUTPATIENT)
Dept: CARDIOLOGY | Facility: CLINIC | Age: 78
End: 2023-12-20
Payer: MEDICARE

## 2023-12-20 VITALS
HEART RATE: 55 BPM | RESPIRATION RATE: 15 BRPM | HEIGHT: 65 IN | WEIGHT: 184 LBS | BODY MASS INDEX: 30.66 KG/M2 | OXYGEN SATURATION: 97 % | SYSTOLIC BLOOD PRESSURE: 136 MMHG | DIASTOLIC BLOOD PRESSURE: 82 MMHG

## 2023-12-20 DIAGNOSIS — I42.9 CARDIOMYOPATHY, UNSPECIFIED: ICD-10-CM

## 2023-12-20 DIAGNOSIS — I10 ESSENTIAL (PRIMARY) HYPERTENSION: ICD-10-CM

## 2023-12-20 DIAGNOSIS — I25.10 ATHEROSCLEROTIC HEART DISEASE OF NATIVE CORONARY ARTERY W/OUT ANGINA PECTORIS: ICD-10-CM

## 2023-12-20 DIAGNOSIS — I44.7 LEFT BUNDLE-BRANCH BLOCK, UNSPECIFIED: ICD-10-CM

## 2023-12-20 PROCEDURE — 99214 OFFICE O/P EST MOD 30 MIN: CPT

## 2023-12-20 PROCEDURE — 93000 ELECTROCARDIOGRAM COMPLETE: CPT

## 2023-12-20 NOTE — DISCUSSION/SUMMARY
[FreeTextEntry1] : EKG today shows:  Sinus Bradycardia at 57 bpm.  LAD, first degree A-V block, LBBB with associated ST/T abn; no significant change.   PLAN: 1.  Cardiomyopathy, non-ischemic, ?on a hypertensive basis.  LV EF was 45% on TTE in Dec. of 2022.  Remains free of sxs. and appears well compensated.   - continue current doses of losartan and carvedilol.    2.  HTN -  BP in acceptable range - continue current meds.  3.  CAD, non-obstructive disease on cardiac CTA in Oct. 2015; pharm-stress-thallium Jan 2018 showed stable findings. - continue atorvastatin and low dose aspirin.  4.  LBBB - unchanged.  5.  LE edema, due to venous insufficiency and also in part side effect of amlodipine - remains well controlledwith low salt diet and Dyazide.  33 minutes spent on today's office visit.   Mr. Amaya will return for a follow-up visit in 6 months.  [EKG obtained to assist in diagnosis and management of assessed problem(s)] : EKG obtained to assist in diagnosis and management of assessed problem(s)

## 2023-12-20 NOTE — HISTORY OF PRESENT ILLNESS
[FreeTextEntry1] : Since I last saw him, he remains stable and well from a cardiac standpoint.  He continues his usual activities and there have been no episodes of exertional chest discomfort or ANDRADE.  He recounts palpitations and no episodes of lightheadedness/LOC.  He reports no episodes of orthopnea or PND.  He reports no new interval medical problems.  Medications are unchanged.  He continues to see Dr. Merlos regularly.

## 2023-12-20 NOTE — REASON FOR VISIT
[FreeTextEntry1] :   Wade Amaya returns for f/u regarding reduced HTN, CAD, impaired LV EF and LBBB.

## 2024-07-02 ENCOUNTER — APPOINTMENT (OUTPATIENT)
Dept: CARDIOLOGY | Facility: CLINIC | Age: 79
End: 2024-07-02

## 2025-05-22 ENCOUNTER — NON-APPOINTMENT (OUTPATIENT)
Age: 80
End: 2025-05-22